# Patient Record
(demographics unavailable — no encounter records)

---

## 2025-06-23 NOTE — ASSESSMENT
[FreeTextEntry1] : Needs Tdap and pneumonia shots today.  Get flu and COVID shots in fall per CDC recs. Reportedly up to date with colonoscopy and PSA screening. Focus on diet/exercise/lifestyle. Check hepatitis labs and vaccine if not immune. [Vaccines Reviewed] : Immunizations reviewed today. Please see immunization details in the vaccine log within the immunization flowsheet.

## 2025-06-23 NOTE — HEALTH RISK ASSESSMENT
[0] : 2) Feeling down, depressed, or hopeless: Not at all (0) [PHQ-2 Negative - No further assessment needed] : PHQ-2 Negative - No further assessment needed [Never] : Never [0-4] : 0-4 [[id number]] : 0

## 2025-06-23 NOTE — HISTORY OF PRESENT ILLNESS
[FreeTextEntry1] : PE and establish care. [de-identified] : Pt here to establish care.  Needs PE. Had PCP with [facility] in the [place].  But PCP retired just before the pancdemic.  Had a hard time finding a PCP to talk about sex, etc. Been with male partner for 25 years.  Had adopted black boy at his birth.  Will be 15 next month.  Partner is .  The child has special needs. Pt works as  of [employer].  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  Busy. Met partner in 2000.  Got testicular cancer in 2001.  Had surgery and radiation through [facility] then [facility].  [place] some homophobia in the process.  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 Started with urology 6 years ago.  Due to low testosterone.  Gets seen three times a year.  Check T levels, liver, and PSA.  Self-injects weekly. Discovered "enlarged liver" this year.  Had LFTs that were normal.  Reports normal CT scan except that liver is slightly large. History of relationship problems.  Opened up marriage recently.  Both partners have had other partners.  However, he hasn't had another partner recently and not sure about the near future.  Wants STI screening today.  Had been on PrEP a year ago for six weeks.  Stopped it because wasn't having other partners.  Doesn't think he needs it now. Has noticed elevated blood pressure reading at his phlebotomy center.  Therefore, saw cardiology in the spring 2025.  Got blood tests and EKG.  Everything reportedly normal.  Cardiology asked pt to track BP at home.  Was in a normal range at home on average.  Cardiologist declined meds for now but continues to track. Doesn't think he ever had pneumonia shot. Had shingles immunizations (about a year ago). Had Tdap about 10 years ago. Had flu shot in the fall. Colonoscopy: summer 2024. Hemorrhoid.  Repeat in 5 years.